# Patient Record
Sex: MALE | Race: BLACK OR AFRICAN AMERICAN | NOT HISPANIC OR LATINO | Employment: FULL TIME | URBAN - METROPOLITAN AREA
[De-identification: names, ages, dates, MRNs, and addresses within clinical notes are randomized per-mention and may not be internally consistent; named-entity substitution may affect disease eponyms.]

---

## 2022-08-31 NOTE — PROGRESS NOTES
Assessment/Plan:    Differentials based on presentation suggestive of b/l plantar fascitis (due to location) vs peripheral neuropathy (due to bilateral nature and nocturnal presentation)  Will order lab work to investigate neuropathy cause  Encouraged patient to add heel inserts to his athletic and dress shoes in case neuropathy workup is negative and plantar fascitis is the presumed cause  Discussed what signs and symptoms warrant emergent medical care  Patient verbalized understanding  Will follow-up with patient regarding labs results  No problem-specific Assessment & Plan notes found for this encounter  Diagnoses and all orders for this visit:    Bilateral foot pain  -     CBC and differential; Future  -     Comprehensive metabolic panel; Future  -     Hemoglobin A1C; Future  -     Vitamin B12; Future  -     TSH, 3rd generation with Free T4 reflex; Future    BMI 35 0-35 9,adult  -     CBC and differential; Future  -     Comprehensive metabolic panel; Future  -     Hemoglobin A1C; Future  -     TSH, 3rd generation with Free T4 reflex; Future    Benign essential hypertension  -     CBC and differential; Future  -     Comprehensive metabolic panel; Future    Nonischemic cardiomyopathy (HCC)  -     CBC and differential; Future  -     Comprehensive metabolic panel; Future    Other orders  -     losartan (COZAAR) 100 MG tablet; Take 50 mg by mouth daily  -     LORazepam (ATIVAN) 2 mg tablet; if needed  -     carvedilol (COREG) 6 25 mg tablet        Subjective:      Patient ID: Gabriele Hall is a 49 y o  male  Sees [facility], recent dx of R acl tear and R medial meniscal injury  Last visit with them was on 8/17/22, conservative treatment at this time  Subjective    Gabriele Hall is a 49 y o  male who presents with bilateral foot pain  Onset of the symptoms was 3 months ago  Sore at heels at night, now having during day at times  Precipitating event: none known   Current symptoms include: stiffness in the AM hours  Aggravating factors: night-time hours  "Sometimes pain gets to level of 8/10 at night"  Symptoms have gradually worsened  Patient has had no prior foot problems  Evaluation to date: none  Treatment to date: none  Patient indicates the pain is to the medial aspect of his b/l heels  The following portions of the patient's history were reviewed and updated as appropriate: allergies, current medications, past family history, past medical history, past social history, past surgical history and problem list     Review of Systems   Constitutional: Negative  Negative for chills, fatigue and fever  HENT: Negative  Eyes: Negative  Respiratory: Negative  Negative for shortness of breath  Cardiovascular: Negative  Negative for chest pain  Gastrointestinal: Negative  Endocrine: Negative  Genitourinary: Positive for frequency  Musculoskeletal: Negative  Skin: Negative  Allergic/Immunologic: Negative  Neurological: Positive for light-headedness and numbness  Had lightheadedness "earlier this week"    Nocturnal numbness/tingling in b/l feet   Hematological: Negative  Psychiatric/Behavioral: Negative  I have spent 30 minutes with Patient  today in which greater than 50% of this time was spent in counseling/coordination of care regarding Prognosis, Risks and benefits of tx options, Intructions for management, Patient and family education and Impressions  Objective:      /70 (BP Location: Left arm, Patient Position: Sitting, Cuff Size: Large)   Pulse 88   Temp 98 2 °F (36 8 °C)   Ht 6' (1 829 m)   Wt 117 kg (259 lb)   SpO2 98%   BMI 35 13 kg/m²          Physical Exam  Vitals and nursing note reviewed  Constitutional:       Appearance: Normal appearance  He is obese  HENT:      Head: Normocephalic  Cardiovascular:      Rate and Rhythm: Normal rate and regular rhythm  Pulses: Normal pulses        Heart sounds: Normal heart sounds  Pulmonary:      Effort: Pulmonary effort is normal       Breath sounds: Normal breath sounds  Musculoskeletal:         General: No swelling or tenderness  Right lower leg: No edema  Left lower leg: No edema  Right foot: Normal capillary refill  No swelling, deformity, bunion, tenderness or bony tenderness  Normal pulse  Left foot: Normal capillary refill  No swelling, deformity, bunion, tenderness or bony tenderness  Normal pulse  Skin:     General: Skin is warm and dry  Findings: No rash  Neurological:      General: No focal deficit present  Mental Status: He is alert and oriented to person, place, and time  Cranial Nerves: No cranial nerve deficit  Sensory: No sensory deficit  Motor: No weakness        Coordination: Coordination normal       Gait: Gait normal       Deep Tendon Reflexes: Reflexes normal    Psychiatric:         Mood and Affect: Mood normal          Behavior: Behavior normal

## 2022-09-01 ENCOUNTER — OFFICE VISIT (OUTPATIENT)
Dept: FAMILY MEDICINE CLINIC | Facility: CLINIC | Age: 47
End: 2022-09-01
Payer: COMMERCIAL

## 2022-09-01 VITALS
HEART RATE: 88 BPM | HEIGHT: 72 IN | OXYGEN SATURATION: 98 % | DIASTOLIC BLOOD PRESSURE: 70 MMHG | WEIGHT: 259 LBS | TEMPERATURE: 98.2 F | SYSTOLIC BLOOD PRESSURE: 118 MMHG | BODY MASS INDEX: 35.08 KG/M2

## 2022-09-01 DIAGNOSIS — I42.8 NONISCHEMIC CARDIOMYOPATHY (HCC): ICD-10-CM

## 2022-09-01 DIAGNOSIS — M79.672 BILATERAL FOOT PAIN: Primary | ICD-10-CM

## 2022-09-01 DIAGNOSIS — I10 BENIGN ESSENTIAL HYPERTENSION: ICD-10-CM

## 2022-09-01 DIAGNOSIS — M79.671 BILATERAL FOOT PAIN: Primary | ICD-10-CM

## 2022-09-01 PROBLEM — M17.11 PATELLOFEMORAL ARTHRITIS OF RIGHT KNEE: Status: ACTIVE | Noted: 2020-01-20

## 2022-09-01 PROBLEM — I83.92 VARICOSE VEINS OF LEFT LOWER EXTREMITY: Status: ACTIVE | Noted: 2022-09-01

## 2022-09-01 PROBLEM — K21.9 GASTROESOPHAGEAL REFLUX DISEASE: Status: ACTIVE | Noted: 2020-01-20

## 2022-09-01 PROBLEM — S83.8X1A ACUTE MEDIAL MENISCAL INJURY OF RIGHT KNEE: Status: ACTIVE | Noted: 2022-09-01

## 2022-09-01 PROBLEM — S83.511A RIGHT ACL TEAR: Status: ACTIVE | Noted: 2022-09-01

## 2022-09-01 PROCEDURE — 99203 OFFICE O/P NEW LOW 30 MIN: CPT | Performed by: NURSE PRACTITIONER

## 2022-09-01 RX ORDER — LOSARTAN POTASSIUM 100 MG/1
50 TABLET ORAL DAILY
COMMUNITY
Start: 2022-06-07

## 2022-09-01 RX ORDER — LORAZEPAM 2 MG/1
TABLET ORAL AS NEEDED
COMMUNITY
Start: 2022-07-19

## 2022-09-01 RX ORDER — CARVEDILOL 6.25 MG/1
TABLET ORAL
COMMUNITY

## 2022-09-01 NOTE — PATIENT INSTRUCTIONS
Please complete lab work fasting    In the meantime, please consider adding heel inserts to your athletic and dress shoes    Will call you with results and impressions    Please call the office with any questions or concerns regarding your treatment or plan of care

## 2022-09-14 ENCOUNTER — CLINICAL SUPPORT (OUTPATIENT)
Dept: FAMILY MEDICINE CLINIC | Facility: CLINIC | Age: 47
End: 2022-09-14
Payer: COMMERCIAL

## 2022-09-14 VITALS
HEIGHT: 72 IN | SYSTOLIC BLOOD PRESSURE: 120 MMHG | BODY MASS INDEX: 35.08 KG/M2 | DIASTOLIC BLOOD PRESSURE: 74 MMHG | OXYGEN SATURATION: 98 % | TEMPERATURE: 98 F | WEIGHT: 259 LBS | HEART RATE: 84 BPM

## 2022-09-14 PROCEDURE — 36415 COLL VENOUS BLD VENIPUNCTURE: CPT

## 2022-09-15 LAB — HBA1C MFR BLD HPLC: 4.6 %

## 2022-10-10 ENCOUNTER — TELEPHONE (OUTPATIENT)
Dept: FAMILY MEDICINE CLINIC | Facility: CLINIC | Age: 47
End: 2022-10-10

## 2022-10-10 NOTE — TELEPHONE ENCOUNTER
Called patient and left a message regarding lab results  Labs ordered were unremarkable, were ordered for neuropathy workup    Informed patient that if bilateral foot pain is still occurring, it would be a good idea to follow-up with a podiatrist   Encouraged the patient to call our office if he has any questions or concerns regarding seeing a podiatry specialist

## 2022-11-16 ENCOUNTER — OFFICE VISIT (OUTPATIENT)
Dept: FAMILY MEDICINE CLINIC | Facility: CLINIC | Age: 47
End: 2022-11-16

## 2022-11-16 VITALS
DIASTOLIC BLOOD PRESSURE: 76 MMHG | BODY MASS INDEX: 35.08 KG/M2 | WEIGHT: 259 LBS | HEIGHT: 72 IN | OXYGEN SATURATION: 96 % | SYSTOLIC BLOOD PRESSURE: 124 MMHG | HEART RATE: 83 BPM

## 2022-11-16 DIAGNOSIS — J40 BRONCHITIS: Primary | ICD-10-CM

## 2022-11-16 RX ORDER — ALBUTEROL SULFATE 90 UG/1
2 AEROSOL, METERED RESPIRATORY (INHALATION)
COMMUNITY
Start: 2022-09-18

## 2022-11-16 RX ORDER — METHYLPREDNISOLONE 4 MG/1
TABLET ORAL
Qty: 21 EACH | Refills: 0
Start: 2022-11-16

## 2022-11-16 NOTE — PROGRESS NOTES
Name: Ariadne Estrada      : 1975      MRN: [id number]  Encounter Provider: TOMAS Lopez  Encounter Date: 2022   Encounter department: [id number] [facility] [address]     Presentation is suggestive of post viral URI bronchitis  Antibiotics likely not necessary at this time  Due to history of asthma, provided patient with a medrol pack and mucinex DM to help with respiratory symptoms  Encouraged the continued use of: rest, hydration, prn albuterol, and OTC analgesics  Encouraged patient to contact our office if his respiratory symptoms are not improving by Friday  Discussed what signs and symptoms warrant emergent medical care  Patient verbalized understanding  1  Bronchitis  -     methylPREDNISolone 4 MG tablet therapy pack; Use as directed on package  -     dextromethorphan-guaifenesin (MUCINEX DM)  MG per 12 hr tablet; Take 1 tablet by mouth every 12 (twelve) hours         Subjective      Primary complaint: cough (dry initially, now productive, "light" color)  Onset of illness:   Corresponding symptoms: chills, fever on , chest pressure, SOB  Course (improving, worsening, stable): slightly better today other than chest tightness  Fever (TMAX): 100 on , no fever since   Respiratory Symptoms?: SOB and chest pressure  Recent sick contacts: many sick contacts at school  Recent COVID dx/test: tested at home Fri and Sat, negative  Hx of similar illnesses?: recurrent bronchitis 1x year  Hx of asthma and seasonal allergies  Recent antibiotics: No  Treatments?: albuterol and OTC cold meds    limited relief       Review of Systems   Constitutional: Positive for chills, fatigue and fever  HENT: Negative  Negative for congestion, sinus pressure, sinus pain, sneezing and sore throat  Eyes: Negative  Respiratory: Positive for cough, chest tightness and shortness of breath  Cardiovascular: Negative  Gastrointestinal: Negative  Endocrine: Negative  Genitourinary: Negative  Musculoskeletal: Negative  Skin: Negative  Allergic/Immunologic: Negative  Neurological: Negative  Hematological: Negative  Psychiatric/Behavioral: Negative  Current Outpatient Medications on File Prior to Visit   Medication Sig   • albuterol (PROVENTIL HFA,VENTOLIN HFA) 90 mcg/act inhaler Inhale 2 puffs every 4 - 6 hours as needed   • carvedilol (COREG) 6 25 mg tablet    • LORazepam (ATIVAN) 2 mg tablet if needed   • losartan (COZAAR) 100 MG tablet Take 50 mg by mouth daily       Objective     /76 (BP Location: Left arm, Patient Position: Sitting, Cuff Size: Large)   Pulse 83   Ht 6' (1 829 m)   Wt 117 kg (259 lb)   SpO2 96%   BMI 35 13 kg/m²     Physical Exam  Constitutional:       General: He is not in acute distress  Appearance: Normal appearance  He is ill-appearing  HENT:      Head: Normocephalic and atraumatic  Right Ear: Tympanic membrane normal       Left Ear: Tympanic membrane normal       Nose: Nose normal  No congestion or rhinorrhea  Mouth/Throat:      Mouth: Mucous membranes are moist       Pharynx: Oropharynx is clear  No oropharyngeal exudate or posterior oropharyngeal erythema  Eyes:      Conjunctiva/sclera: Conjunctivae normal    Cardiovascular:      Rate and Rhythm: Normal rate and regular rhythm  Pulses: Normal pulses  Heart sounds: Normal heart sounds  Pulmonary:      Effort: Pulmonary effort is normal  No respiratory distress  Breath sounds: No stridor  Wheezing present  No rhonchi or rales  Comments: Wheezing L upper lung field  Chest:      Chest wall: No tenderness  Musculoskeletal:         General: Normal range of motion  Cervical back: Normal range of motion  Lymphadenopathy:      Cervical: No cervical adenopathy  Skin:     General: Skin is warm and dry  Findings: No rash     Neurological: General: No focal deficit present  Mental Status: He is alert and oriented to person, place, and time     Psychiatric:         Mood and Affect: Mood normal          Behavior: Behavior normal        TOMAS Shannon

## 2022-11-16 NOTE — PATIENT INSTRUCTIONS
Acute Bronchitis   AMBULATORY CARE:   Acute bronchitis  is swelling and irritation in your lungs  It is usually caused by a virus and most often happens in the winter  Bronchitis may also be caused by bacteria or by a chemical irritant, such as smoke  Common symptoms include the following:   Cough that lasts up to 3 weeks, stuffy nose    Hoarseness, sore throat    A fever and chills    Feeling more tired than usual, and body aches    Wheezing or pain when you breathe or cough    Seek care immediately if:   You cough up blood  Your lips or fingernails turn blue  You feel like you are not getting enough air when you breathe  Call your doctor if:   Your symptoms do not go away or get worse, even after treatment  Your cough does not get better within 4 weeks  You have questions or concerns about your condition or care  Medicines: You may  need any of the following:  Cough suppressants  decrease your urge to cough  Decongestants  help loosen mucus in your lungs and make it easier to cough up  This can help you breathe easier  Inhalers  may be given  Your healthcare provider may give you one or more inhalers to help you breathe easier and cough less  An inhaler gives your medicine to open your airways  Ask your healthcare provider to show you how to use your inhaler correctly  Antibiotics  may be given for up to 5 days if your bronchitis is caused by bacteria  Acetaminophen  decreases pain and fever  It is available without a doctor's order  Ask how much to take and how often to take it  Follow directions  Read the labels of all other medicines you are using to see if they also contain acetaminophen, or ask your doctor or pharmacist  Acetaminophen can cause liver damage if not taken correctly  Do not use more than 4 grams (4,000 milligrams) total of acetaminophen in one day  NSAIDs  help decrease swelling and pain or fever   This medicine is available with or without a doctor's order  NSAIDs can cause stomach bleeding or kidney problems in certain people  If you take blood thinner medicine, always ask your healthcare provider if NSAIDs are safe for you  Always read the medicine label and follow directions  Take your medicine as directed  Contact your healthcare provider if you think your medicine is not helping or if you have side effects  Tell him of her if you are allergic to any medicine  Keep a list of the medicines, vitamins, and herbs you take  Include the amounts, and when and why you take them  Bring the list or the pill bottles to follow-up visits  Carry your medicine list with you in case of an emergency  Self-care:   Drink liquids as directed  You may need to drink more liquids than usual to stay hydrated  Ask how much liquid to drink each day and which liquids are best for you  Use a cool mist humidifier  to increase air moisture in your home  This may make it easier for you to breathe and help decrease your cough  Get more rest   Rest helps your body to heal  Slowly start to do more each day  Rest when you feel it is needed  Avoid irritants in the air  Avoid chemicals, fumes, and dust  Wear a face mask if you must work around dust or fumes  Stay inside on days when air pollution levels are high  If you have allergies, stay inside when pollen counts are high  Do not use aerosol products, such as spray-on deodorant, bug spray, and hair spray  Do not smoke or be around others who are smoking  Nicotine and other chemicals in cigarettes and cigars can cause lung damage  Ask your healthcare provider for information if you currently smoke and need help to quit  E-cigarettes or smokeless tobacco still contain nicotine  Talk to your healthcare provider before you use these products  Prevent acute bronchitis:       Ask about vaccines you may need  Get a flu vaccine each year as soon as recommended, usually in September or October   Ask your healthcare provider if you should also get a pneumonia or COVID-19 vaccine  Your healthcare provider can tell you if you should also get other vaccines, and when to get them  Prevent the spread of germs  You can decrease your risk for acute bronchitis and other illnesses by doing the following:    Wash your hands often with soap and water  Carry germ-killing hand lotion or gel with you  You can use the lotion or gel to clean your hands when soap and water are not available  Do not touch your eyes, nose, or mouth unless you have washed your hands first     Always cover your mouth when you cough to prevent the spread of germs  It is best to cough into a tissue or your shirt sleeve instead of into your hand  Ask those around you to cover their mouths when they cough  Try to avoid people who have a cold or the flu  If you are sick, stay away from others as much as possible  Follow up with your doctor as directed:  Write down questions you have so you will remember to ask them during your follow-up visits  © Copyright [employer] 2022 Information is for End User's use only and may not be sold, redistributed or otherwise used for commercial purposes  All illustrations and images included in CareNotes® are the copyrighted property of [employer]  or [employer]by Avila   The above information is an  only  It is not intended as medical advice for individual conditions or treatments  Talk to your doctor, nurse or pharmacist before following any medical regimen to see if it is safe and effective for you

## 2022-11-22 ENCOUNTER — TELEPHONE (OUTPATIENT)
Dept: FAMILY MEDICINE CLINIC | Facility: CLINIC | Age: 47
End: 2022-11-22

## 2022-11-22 DIAGNOSIS — J06.9 BACTERIAL UPPER RESPIRATORY INFECTION: Primary | ICD-10-CM

## 2022-11-22 DIAGNOSIS — B96.89 BACTERIAL UPPER RESPIRATORY INFECTION: Primary | ICD-10-CM

## 2022-11-22 RX ORDER — AMOXICILLIN AND CLAVULANATE POTASSIUM 875; 125 MG/1; MG/1
1 TABLET, FILM COATED ORAL EVERY 12 HOURS SCHEDULED
Qty: 14 TABLET | Refills: 0 | Status: SHIPPED | OUTPATIENT
Start: 2022-11-22 | End: 2022-11-29

## 2022-11-22 NOTE — PROGRESS NOTES
Patient contacted the office stating that he completed his steroid treatment yet still has not improved  Based on this and length of symptoms, etiology may be bacterial  Will order a 7 day regimen of Augmentin

## 2022-11-22 NOTE — TELEPHONE ENCOUNTER
Please let the patient know that because his condition did not improve with the steroid pack, his infection is likely bacterial  As a result, I will send an antibiotic to his pharmacy that he can start ASAP  If he has any further questions, please let me know!

## 2023-06-23 NOTE — PROGRESS NOTES
ADULT ANNUAL [facility] IN PARTNERSHIP WITH [facility]    NAME: Jackie Carrington  AGE: 51 y.o. SEX: male  : 1975     DATE: 7/3/2023     Assessment and Plan:     Problem List Items Addressed This Visit        Respiratory    Mild intermittent asthma     Patient has a rescue inhaler as needed for asthma. Condition is chronic and stable. EZEQUIEL on CPAP     Patient is seen by sleep medicine. He is on a cpap machine nightly. Cardiovascular and Mediastinum    Benign essential hypertension     Patient follows with [facility] cardiology. He is on coreg and losartan. BP in office is 120/73. Relevant Medications    carvedilol (COREG) 12.5 mg tablet    LOSARTAN POTASSIUM PO    Nonischemic cardiomyopathy [facility])     Patient follows with [facility] Cardiology. 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 He had this removed by neurology and ENT. He gets yearly mri by neurology. Other Visit Diagnoses     Annual physical exam    -  Primary    Relevant Orders    Lipid panel    POCT hemoglobin A1c (Completed)    PSA, Total Screen    Basic metabolic panel    Screening for HIV (human immunodeficiency virus)        Patient reports that this was done previously. We will attempt to obtain records    Need for hepatitis C screening test        Patient reports that this was done previously. We will attempt to obtain records    Encounter for vaccination        Up-to-date on tetanus vaccine. Colon cancer screening        Up-to-date on colon cancer screening. Obesity (BMI 30-39. 9)        Screening for tuberculosis        Relevant Orders    TB Skin Test          Immunizations and preventive care screenings were discussed with patient today. Appropriate education was printed on patient's after visit summary. Discussed risks and benefits of prostate cancer screening. We discussed the controversial history of PSA screening for prostate cancer in the [facility] as well as the risk of over detection and over treatment of prostate cancer by way of PSA screening. The patient understands that PSA blood testing is an imperfect way to screen for prostate cancer and that elevated PSA levels in the blood may also be caused by infection, inflammation, prostatic trauma or manipulation, urological procedures, or by benign prostatic enlargement. The role of the digital rectal examination in prostate cancer screening was also discussed and I discussed with him that there is large interobserver variability in the findings of digital rectal examination. Counseling:  Alcohol/drug use: discussed moderation in alcohol intake, the recommendations for healthy alcohol use, and avoidance of illicit drug use. Dental Health: discussed importance of regular tooth brushing, flossing, and dental visits. · Exercise: the importance of regular exercise/physical activity was discussed. Recommend exercise 3-5 times per week for at least 30 minutes. BMI Counseling: Body mass index is 35.13 kg/m². The BMI is above normal. Nutrition recommendations include encouraging healthy choices of fruits and vegetables, limiting drinks that contain sugar and increasing intake of lean protein. Exercise recommendations include moderate physical activity 150 minutes/week and exercising 3-5 times per week. Rationale for BMI follow-up plan is due to patient being overweight or obese. Depression Screening and Follow-up Plan: Patient was screened for depression during today's encounter. They screened negative with a PHQ-2 score of 0. Patient was seen today for an annual physical exam. Age appropriate screening tests were done as above.  Annual labs were ordered to be done through [employer]. Patient will be contacted regarding results and follow up will be based on findings. Patient was counseled on the importance of proper diet and exercise. I recommend diets rich in lean meats and leafy green vegetables. Try to have 150 minutes of exercise weekly at moderate intensity. See problem based charting for any chronic problems or new findings and current workup/management. a1C was discussed in office today. Patient needed any examination for work physical today. I had patient undergo PPD testing today. He will return to office on Thursday to have this read. I recommend he continue to follow-up regularly with his cardiology, pulm, and neuro specialists. 40 minutes were spent on chart review, examination, and plan of care. This note was dictated using software. Return in about 1 year (around 7/3/2024) for Annual physical, ppd read Thursday. Chief Complaint:     Chief Complaint   Patient presents with   • Physical Exam   • Care Gap Request      History of Present Illness:     Adult Annual Physical   Patient here for a comprehensive physical exam. The patient reports establish, work physical.    Diet and Physical Activity  · Diet/Nutrition: mixed diet, breakfast and early dinner. .   · Exercise: moderate cardiovascular exercise and biking and weights. Depression Screening  PHQ-2/9 Depression Screening    Little interest or pleasure in doing things: 0 - not at all  Feeling down, depressed, or hopeless: 0 - not at all  PHQ-2 Score: 0  PHQ-2 Interpretation: Negative depression screen       General Health  · Sleep: sleeps well. · Hearing: normal - bilateral.  · Vision: no vision problems and wears glasses. · Dental: regular dental visits and brushes teeth three times daily.  Health  · Symptoms include: none     Review of Systems:     Review of Systems   Respiratory: Negative for shortness of breath. Cardiovascular: Negative for chest pain. Gastrointestinal: Negative for abdominal pain, constipation and diarrhea. Genitourinary: Negative for dysuria. Skin: Negative for rash. 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 No pertinent surgical history. Family History:     History reviewed. No pertinent family history. Social History:     Social History     Socioeconomic History   • Marital status: Single     Spouse name: None   • Number of children: None   • Years of education: None   • Highest education level: None   Occupational History   • None   Tobacco Use   • Smoking status: Never   • Smokeless tobacco: Never   Substance and Sexual Activity   • Alcohol use: Not Currently   • Drug use: Never   • Sexual activity: None   Other Topics Concern   • None   Social History Narrative   • None     Social Determinants of Health     Financial Resource Strain: Not on file   Food Insecurity: Not on file   Transportation Needs: Not on file   Physical Activity: Not on file   Stress: Not on file   Social Connections: Not on file   Intimate Partner Violence: Not on file   Housing Stability: Not on file      Current Medications:     Current Outpatient Medications   Medication Sig Dispense Refill   • albuterol (PROVENTIL HFA,VENTOLIN HFA) 90 mcg/act inhaler Inhale 2 puffs every 4 - 6 hours as needed     • carvedilol (COREG) 12.5 mg tablet Take 12.5 mg by mouth 2 (two) times a day     • LORazepam (ATIVAN) 2 mg tablet if needed     • losartan (COZAAR) 100 MG tablet Take 50 mg by mouth daily     • LOSARTAN POTASSIUM PO Losartan Potassium     • Multiple Vitamin (MULTIVITAMIN ADULT PO)   Start: 02/28/23 15:48:00 EST, 1 tab, PO, Daily       No current facility-administered medications for this visit. Allergies:      Allergies   Allergen Reactions   • Shellfish Allergy - Food Allergy Shortness Of Breath   • Iodinated Contrast Media Swelling, Itching and Nausea Only     Pt had orbital swelling with a routine pre med with 2007 CT scan at [facility]      Physical Exam:     /73 (BP Location: Right arm, Patient Position: Sitting, Cuff Size: Large)   Pulse 95   Temp 98 °F (36.7 °C)   Ht 6' (1.829 m)   Wt 117 kg (259 lb)   SpO2 98%   BMI 35.13 kg/m²     Physical Exam  Constitutional:       General: He is not in acute distress. Appearance: Normal appearance. He is obese. HENT:      Head: Normocephalic and atraumatic. Right Ear: Tympanic membrane, ear canal and external ear normal. There is no impacted cerumen. Left Ear: Tympanic membrane, ear canal and external ear normal. There is no impacted cerumen. Nose: Nose normal. No congestion. Mouth/Throat:      Mouth: Mucous membranes are moist.      Pharynx: Oropharynx is clear. No oropharyngeal exudate or posterior oropharyngeal erythema. Eyes:      Extraocular Movements: Extraocular movements intact. Conjunctiva/sclera: Conjunctivae normal.      Pupils: Pupils are equal, round, and reactive to light. Cardiovascular:      Rate and Rhythm: Normal rate and regular rhythm. Heart sounds: Normal heart sounds. No murmur heard. No friction rub. No gallop. Pulmonary:      Effort: Pulmonary effort is normal. No respiratory distress. Breath sounds: Normal breath sounds. No wheezing. Abdominal:      General: Bowel sounds are normal.      Palpations: Abdomen is soft. Tenderness: There is no abdominal tenderness. There is no guarding. Musculoskeletal:         General: Normal range of motion. Cervical back: Normal range of motion and neck supple. Lymphadenopathy:      Cervical: No cervical adenopathy. Skin:     General: Skin is warm and dry. Findings: No erythema or rash. Neurological:      General: No focal deficit present. Mental Status: He is alert and oriented to person, place, and time. Mental status is at baseline. Cranial Nerves: No cranial nerve deficit. Motor: No weakness. Psychiatric:         Mood and Affect: Mood normal.         Behavior: Behavior normal.         Thought Content:  Thought content normal.         Judgment: Judgment normal.          Serenity Godoy, DO  [address] WITH [facility]

## 2023-06-29 RX ORDER — CARVEDILOL 12.5 MG/1
12.5 TABLET ORAL 2 TIMES DAILY
COMMUNITY
Start: 2023-06-06

## 2023-06-30 ENCOUNTER — TELEPHONE (OUTPATIENT)
Dept: ADMINISTRATIVE | Facility: OTHER | Age: 48
End: 2023-06-30

## 2023-06-30 NOTE — TELEPHONE ENCOUNTER
Upon review of the In Basket request we were able to locate, review, and update the patient chart as requested for CRC: Colonoscopy  Any additional questions or concerns should be emailed to the Practice Liaisons via the appropriate education email address, please do not reply via In Basket  Thank you  Soto Mckay         06/30/23 9:57 AM     [facility] Care[employer]ap SmartForm used to document caregap status      Soto Mckay

## 2023-06-30 NOTE — LETTER
Procedure Request Form: Colonoscopy  Colonoscopy from 2022      Date Requested: 23  Patient: Olivia Dys  Patient : 1975   Referring Provider: No primary care provider on file  Date of Procedure ______________________________       The above patient has informed us that they have completed their   most recent Colonoscopy at your facility  Please complete   this form and attach all corresponding procedure reports/results  Comments __________________________________________________________  ____________________________________________________________________  ____________________________________________________________________  ____________________________________________________________________    Facility Completing Procedure _________________________________________    Form Completed By (print name) _______________________________________      Signature __________________________________________________________      These reports are needed for  compliance  Please fax this completed form and a copy of the procedure report to our office located at [place] [address] as soon as possible to Fax [phone] [place]ton Donohue: Phone [phone]    We thank you for your assistance in treating our mutual patient

## 2023-06-30 NOTE — TELEPHONE ENCOUNTER
----- Message from Edin Knife sent at 6/30/2023  7:12 AM EDT -----  Regarding: Care Gap Request  06/30/23 7:12 AM    Hello, our patient attached above has had CRC: Colonoscopy completed/performed  Please assist in updating the patient chart by pulling the document from the Media Tab  The date of service is 6/29/2023       Thank you,  [id number] [facility]

## 2023-06-30 NOTE — TELEPHONE ENCOUNTER
Upon review of the In Basket request and the patient's chart, initial outreach has been made via fax to facility  Please see Contacts section for details  The file in media does not have the results from the colonoscopy or the full colonoscopy report  Outreach performed, will update once a response is received       Thank you  Martín Cavanaugh

## 2023-07-03 ENCOUNTER — OFFICE VISIT (OUTPATIENT)
Age: 48
End: 2023-07-03

## 2023-07-03 VITALS
HEIGHT: 72 IN | TEMPERATURE: 98 F | DIASTOLIC BLOOD PRESSURE: 73 MMHG | HEART RATE: 95 BPM | SYSTOLIC BLOOD PRESSURE: 120 MMHG | OXYGEN SATURATION: 98 % | BODY MASS INDEX: 35.08 KG/M2 | WEIGHT: 259 LBS

## 2023-07-03 DIAGNOSIS — Z11.59 NEED FOR HEPATITIS C SCREENING TEST: ICD-10-CM

## 2023-07-03 DIAGNOSIS — J45.20 MILD INTERMITTENT ASTHMA WITHOUT COMPLICATION: ICD-10-CM

## 2023-07-03 DIAGNOSIS — I10 BENIGN ESSENTIAL HYPERTENSION: ICD-10-CM

## 2023-07-03 DIAGNOSIS — I42.8 NONISCHEMIC CARDIOMYOPATHY (HCC): ICD-10-CM

## 2023-07-03 DIAGNOSIS — Z11.1 SCREENING FOR TUBERCULOSIS: ICD-10-CM

## 2023-07-03 DIAGNOSIS — Z00.00 ANNUAL PHYSICAL EXAM: Primary | ICD-10-CM

## 2023-07-03 DIAGNOSIS — Z99.89 OSA ON CPAP: ICD-10-CM

## 2023-07-03 DIAGNOSIS — G47.33 OSA ON CPAP: ICD-10-CM

## 2023-07-03 DIAGNOSIS — Z12.11 COLON CANCER SCREENING: ICD-10-CM

## 2023-07-03 DIAGNOSIS — R22.0 HEAD MASS: ICD-10-CM

## 2023-07-03 DIAGNOSIS — E66.9 OBESITY (BMI 30-39.9): ICD-10-CM

## 2023-07-03 DIAGNOSIS — Z11.4 SCREENING FOR HIV (HUMAN IMMUNODEFICIENCY VIRUS): ICD-10-CM

## 2023-07-03 DIAGNOSIS — Z23 ENCOUNTER FOR VACCINATION: ICD-10-CM

## 2023-07-03 PROBLEM — I48.91 ATRIAL FIBRILLATION (HCC): Status: ACTIVE | Noted: 2023-07-03

## 2023-07-03 LAB — SL AMB POCT HEMOGLOBIN AIC: 5 (ref ?–6.5)

## 2023-07-03 PROCEDURE — 99396 PREV VISIT EST AGE 40-64: CPT | Performed by: STUDENT IN AN ORGANIZED HEALTH CARE EDUCATION/TRAINING PROGRAM

## 2023-07-03 PROCEDURE — 86580 TB INTRADERMAL TEST: CPT

## 2023-07-03 PROCEDURE — 83036 HEMOGLOBIN GLYCOSYLATED A1C: CPT | Performed by: STUDENT IN AN ORGANIZED HEALTH CARE EDUCATION/TRAINING PROGRAM

## 2023-07-03 PROCEDURE — 36415 COLL VENOUS BLD VENIPUNCTURE: CPT | Performed by: STUDENT IN AN ORGANIZED HEALTH CARE EDUCATION/TRAINING PROGRAM

## 2023-07-03 NOTE — PATIENT INSTRUCTIONS
It was a pleasure to see you today! Above is a summary of your visit. If you receive an email link to evaluate your experience today, we would appreciate it if you took a few minutes to fill it out. Your feedback is very important to us. Have a great rest of your day!

## 2023-07-03 NOTE — ASSESSMENT & PLAN NOTE
Patient follows with [facility] cardiology. He is on coreg and losartan. BP in office is 120/73.

## 2023-07-03 NOTE — ASSESSMENT & PLAN NOTE
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 He had this removed by neurology and ENT. He gets yearly mri by neurology.

## 2023-07-04 DIAGNOSIS — E78.5 HYPERLIPIDEMIA, UNSPECIFIED HYPERLIPIDEMIA TYPE: Primary | ICD-10-CM

## 2023-07-04 LAB
BUN SERPL-MCNC: 14 MG/DL (ref 7–25)
BUN/CREAT SERPL: NORMAL (CALC) (ref 6–22)
CALCIUM SERPL-MCNC: 9.4 MG/DL (ref 8.6–10.3)
CHLORIDE SERPL-SCNC: 103 MMOL/L (ref 98–110)
CHOLEST SERPL-MCNC: 200 MG/DL
CHOLEST/HDLC SERPL: 3.7 (CALC)
CO2 SERPL-SCNC: 26 MMOL/L (ref 20–32)
CREAT SERPL-MCNC: 1.14 MG/DL (ref 0.6–1.29)
GFR/BSA.PRED SERPLBLD CYS-BASED-ARV: 79 ML/MIN/1.73M2
GLUCOSE SERPL-MCNC: 70 MG/DL (ref 65–99)
HDLC SERPL-MCNC: 54 MG/DL
LDLC SERPL CALC-MCNC: 128 MG/DL (CALC)
NONHDLC SERPL-MCNC: 146 MG/DL (CALC)
POTASSIUM SERPL-SCNC: 4.5 MMOL/L (ref 3.5–5.3)
PSA SERPL-MCNC: 1.26 NG/ML
SODIUM SERPL-SCNC: 137 MMOL/L (ref 135–146)
TRIGL SERPL-MCNC: 85 MG/DL

## 2023-07-05 DIAGNOSIS — E78.5 HYPERLIPIDEMIA, UNSPECIFIED HYPERLIPIDEMIA TYPE: Primary | ICD-10-CM

## 2023-07-05 RX ORDER — ATORVASTATIN CALCIUM 20 MG/1
20 TABLET, FILM COATED ORAL DAILY
Qty: 90 TABLET | Refills: 3 | Status: SHIPPED | OUTPATIENT
Start: 2023-07-05

## 2023-07-06 DIAGNOSIS — E78.5 HYPERLIPIDEMIA, UNSPECIFIED HYPERLIPIDEMIA TYPE: Primary | ICD-10-CM

## 2023-07-06 LAB
INDURATION: 0 MM
TB SKIN TEST: NEGATIVE